# Patient Record
Sex: MALE | Race: WHITE | Employment: FULL TIME | URBAN - METROPOLITAN AREA
[De-identification: names, ages, dates, MRNs, and addresses within clinical notes are randomized per-mention and may not be internally consistent; named-entity substitution may affect disease eponyms.]

---

## 2023-01-16 ENCOUNTER — OFFICE VISIT (OUTPATIENT)
Dept: UROLOGY | Age: 28
End: 2023-01-16

## 2023-01-16 DIAGNOSIS — N48.1 BALANITIS: Primary | ICD-10-CM

## 2023-01-16 LAB
BILIRUBIN, URINE, POC: NEGATIVE
BLOOD URINE, POC: NEGATIVE
GLUCOSE URINE, POC: NEGATIVE
KETONES, URINE, POC: NEGATIVE
LEUKOCYTE ESTERASE, URINE, POC: NEGATIVE
NITRITE, URINE, POC: NEGATIVE
PH, URINE, POC: 7 (ref 4.6–8)
PROTEIN,URINE, POC: NEGATIVE
SPECIFIC GRAVITY, URINE, POC: 1.01 (ref 1–1.03)
URINALYSIS CLARITY, POC: ABNORMAL
URINALYSIS COLOR, POC: ABNORMAL
UROBILINOGEN, POC: ABNORMAL

## 2023-01-16 PROCEDURE — 81003 URINALYSIS AUTO W/O SCOPE: CPT | Performed by: UROLOGY

## 2023-01-16 PROCEDURE — 99203 OFFICE O/P NEW LOW 30 MIN: CPT | Performed by: UROLOGY

## 2023-01-16 RX ORDER — LORATADINE 10 MG/1
10 CAPSULE, LIQUID FILLED ORAL DAILY
COMMUNITY

## 2023-01-16 RX ORDER — VALACYCLOVIR HYDROCHLORIDE 1 G/1
1000 TABLET, FILM COATED ORAL 2 TIMES DAILY
COMMUNITY

## 2023-01-16 ASSESSMENT — ENCOUNTER SYMPTOMS
BACK PAIN: 1
EYES NEGATIVE: 1
SKIN LESIONS: 1
RESPIRATORY NEGATIVE: 1

## 2023-01-16 NOTE — PROGRESS NOTES
[facility]  [address] [place] [address] Way  [address] [place] [address], [address] [place] [address]  [phone]    Naida Baca  : 1995    Chief Complaint   Patient presents with    New Patient     Balanitis         HPI     Naida Baca is a 32 y.o. male has recurrent balanitis for 5 years. Has been getting treated with creams (antifungals) which have not made any change. States that his glans gets red. There is no tenderness. He feels that the glans is redder or pinker than it used to be. No hx of diabetes. He does have hx of genital herpes but can't remember the last time he had an outbreak. History reviewed. No pertinent past medical history. No past surgical history on file. Current Outpatient Medications   Medication Sig Dispense Refill    valACYclovir (VALTREX) 1 g tablet Take 1,000 mg by mouth 2 times daily      loratadine (CLARITIN) 10 MG capsule Take 10 mg by mouth daily       No current facility-administered medications for this visit.      No Known Allergies  Social History     Socioeconomic History    Marital status: Single     Spouse name: Not on file    Number of children: Not on file    Years of education: Not on file    Highest education level: Not on file   Occupational History    Not on file   Tobacco Use    Smoking status: Never    Smokeless tobacco: Never   Substance and Sexual Activity    Alcohol use: Never    Drug use: Not on file    Sexual activity: Not on file   Other Topics Concern    Not on file   Social History Narrative    Not on file     Social Determinants of Health     Financial Resource Strain: Not on file   Food Insecurity: Not on file   Transportation Needs: Not on file   Physical Activity: Not on file   Stress: Not on file   Social Connections: Not on file   Intimate Partner Violence: Not on file   Housing Stability: Not on file     Family History   Problem Relation Age of Onset    Cancer Maternal Grandmother     Diabetes Maternal Grandmother        Review of Systems  Constitutional: Negative  Skin: Negative skin ROSPositive for skin lesions, rash and itching. Eyes: Eyes negative  ENT: HENT negative  Respiratory: Respiratory negative  Cardiovascular: Neg cardio ROSPositive for regular rate and rhythm. GI: Neg GI ROS  Genitourinary: Positive for hematuria and sexually transmitted disease. Musculoskeletal: Positive for back pain and arthralgias. Psychological: Neg psych ROSPositive for depression. Endocrine: Endocrine negative  Hem/Lymphatic: Hematologic/lymphatic negative    There were no vitals taken for this visit. PE:testes without masses. Glans color is within normal range. No focal lesions. Uncircumcised, no phimosis. Urinalysis  UA - Dipstick  Results for orders placed or performed in visit on 01/16/23   AMB POC URINALYSIS DIP STICK AUTO W/O MICRO   Result Value Ref Range    Color (UA POC)      Clarity (UA POC)      Glucose, Urine, POC Negative Negative    Bilirubin, Urine, POC Negative Negative    KETONES, Urine, POC Negative Negative    Specific Gravity, Urine, POC 1.015 1.001 - 1.035    Blood (UA POC) Negative Negative    pH, Urine, POC 7.0 4.6 - 8.0    Protein, Urine, POC Negative Negative    Urobilinogen, POC 2 mg/dL     Nitrite, Urine, POC Negative Negative    Leukocyte Esterase, Urine, POC Negative Negative       UA - Micro  WBC - 0  RBC - 0  Bacteria - 0  Epith - 0    Physical Exam    Assessment and Plan    ICD-10-CM    1. Balanitis  N48.1 AMB POC URINALYSIS DIP STICK AUTO W/O MICRO          Orders Placed This Encounter   Procedures    AMB POC URINALYSIS DIP STICK AUTO W/O MICRO   Exam looks to be normal variant color of penile skin (glans). I reassured him. No treatment needed. Follow-up and Dispositions    Return if symptoms worsen or fail to improve.

## 2023-02-07 ENCOUNTER — TELEPHONE (OUTPATIENT)
Dept: UROLOGY | Age: 28
End: 2023-02-07

## 2023-02-07 NOTE — TELEPHONE ENCOUNTER
I received a phone call from Aniceto Finn at [facility] requesting patient 01/16/2023 progress note. I faxed note to [phone].